# Patient Record
Sex: FEMALE | Race: WHITE
[De-identification: names, ages, dates, MRNs, and addresses within clinical notes are randomized per-mention and may not be internally consistent; named-entity substitution may affect disease eponyms.]

---

## 2017-11-06 ENCOUNTER — HOSPITAL ENCOUNTER (EMERGENCY)
Dept: HOSPITAL 17 - NEPD | Age: 22
Discharge: HOME | End: 2017-11-06
Payer: COMMERCIAL

## 2017-11-06 VITALS — BODY MASS INDEX: 21.48 KG/M2 | HEIGHT: 63 IN | WEIGHT: 121.25 LBS

## 2017-11-06 VITALS
SYSTOLIC BLOOD PRESSURE: 136 MMHG | HEART RATE: 86 BPM | OXYGEN SATURATION: 100 % | TEMPERATURE: 98.2 F | DIASTOLIC BLOOD PRESSURE: 86 MMHG | RESPIRATION RATE: 12 BRPM

## 2017-11-06 DIAGNOSIS — R10.31: Primary | ICD-10-CM

## 2017-11-06 LAB
ANION GAP SERPL CALC-SCNC: 9 MEQ/L (ref 5–15)
BACTERIA #/AREA URNS HPF: (no result) /HPF
BASOPHILS # BLD AUTO: 0.1 TH/MM3 (ref 0–0.2)
BASOPHILS NFR BLD: 0.7 % (ref 0–2)
BUN SERPL-MCNC: 10 MG/DL (ref 7–18)
CHLORIDE SERPL-SCNC: 104 MEQ/L (ref 98–107)
COLOR UR: (no result)
COMMENT (UR): (no result)
CULTURE IF INDICATED: (no result)
EOSINOPHIL # BLD: 0 TH/MM3 (ref 0–0.4)
EOSINOPHIL NFR BLD: 0.1 % (ref 0–4)
ERYTHROCYTE [DISTWIDTH] IN BLOOD BY AUTOMATED COUNT: 11.7 % (ref 11.6–17.2)
GFR SERPLBLD BASED ON 1.73 SQ M-ARVRAT: 84 ML/MIN (ref 89–?)
GLUCOSE UR STRIP-MCNC: (no result) MG/DL
HCO3 BLD-SCNC: 24.3 MEQ/L (ref 21–32)
HCT VFR BLD CALC: 44.5 % (ref 35–46)
HEMO FLAGS: (no result)
HGB UR QL STRIP: (no result)
KETONES UR STRIP-MCNC: (no result) MG/DL
LYMPHOCYTES # BLD AUTO: 1.5 TH/MM3 (ref 1–4.8)
LYMPHOCYTES NFR BLD AUTO: 11.8 % (ref 9–44)
MCH RBC QN AUTO: 30.1 PG (ref 27–34)
MCHC RBC AUTO-ENTMCNC: 34.9 % (ref 32–36)
MCV RBC AUTO: 86.5 FL (ref 80–100)
MONOCYTES NFR BLD: 3.5 % (ref 0–8)
NEUTROPHILS # BLD AUTO: 10.8 TH/MM3 (ref 1.8–7.7)
NEUTROPHILS NFR BLD AUTO: 83.9 % (ref 16–70)
NITRITE UR QL STRIP: (no result)
PLATELET # BLD: 186 TH/MM3 (ref 150–450)
POTASSIUM SERPL-SCNC: 3.9 MEQ/L (ref 3.5–5.1)
RBC # BLD AUTO: 5.14 MIL/MM3 (ref 4–5.3)
SODIUM SERPL-SCNC: 137 MEQ/L (ref 136–145)
SP GR UR STRIP: 1 (ref 1–1.03)
SQUAMOUS #/AREA URNS HPF: <1 /HPF (ref 0–5)
WBC # BLD AUTO: 12.9 TH/MM3 (ref 4–11)

## 2017-11-06 PROCEDURE — 80048 BASIC METABOLIC PNL TOTAL CA: CPT

## 2017-11-06 PROCEDURE — 84703 CHORIONIC GONADOTROPIN ASSAY: CPT

## 2017-11-06 PROCEDURE — 99285 EMERGENCY DEPT VISIT HI MDM: CPT

## 2017-11-06 PROCEDURE — 81001 URINALYSIS AUTO W/SCOPE: CPT

## 2017-11-06 PROCEDURE — 74177 CT ABD & PELVIS W/CONTRAST: CPT

## 2017-11-06 PROCEDURE — 85025 COMPLETE CBC W/AUTO DIFF WBC: CPT

## 2017-11-06 NOTE — PD
HPI


.


Abdominal pain


Chief Complaint:  Abdominal Pain


Time Seen by Provider:  09:15


Travel History


International Travel<30 days:  No


Contact w/Intl Traveler<30days:  No


Traveled to known affect area:  No





History of Present Illness


HPI


This patient presents with a chief complaint of abdominal pain.  It is located 

in the right lower quadrant.  Onset was 2 AM.  She states that her pain has 

been continuous and dull with episodes of pulsating pain.  The pain has waxed 

and waned.  She rates the pain 2/10.  She states that the pain is exacerbated 

by palpating the area but is not exacerbated by walking.  She reports 

associated nausea and bloating as well as chills.  She did eat breakfast this 

morning.





[facility]


Past Medical History


Medical History:  Denies Significant Hx


Pregnant?:  Not Pregnant


LMP:  10/25/17





Past Surgical History


Surgical History:  No Previous Surgery





Social History


Alcohol Use:  No


Tobacco Use:  No


Substance Use:  No





Allergies-Medications


(Allergen,Severity, Reaction):  


Coded Allergies:  


     No Known Allergies (Unverified , 11/6/17)


Reported Meds & Prescriptions





Reported Meds & Active Scripts


Active


No Active Prescriptions or Reported Medications    








Review of Systems


Except as stated in HPI:  all other systems reviewed are Neg


General / Constitutional:  Positive: Chills, No: Fever


Gastrointestinal:  Positive: Nausea, Abdominal Pain, No: Vomiting, Diarrhea, 

Loss of Appetite


Genitourinary:  No: Urgency, Frequency, Dysuria





Physical Exam


Narrative


GENERAL: Awake and alert.  The patient is smiling.


SKIN:  warm/dry.


HEAD: Normocephalic.  Atraumatic.


EYES: Pupils equal and round. No scleral icterus. No injection or drainage. 


ENT: No nasal bleeding or discharge.  Mucous membranes pink and moist.


NECK: Trachea midline.  Full range of motion without pain.. 


CARDIOVASCULAR: Regular rate and rhythm.  


RESPIRATORY: No accessory muscle use. Clear to auscultation. Breath sounds 

equal bilaterally. 


GASTROINTESTINAL: Abdomen soft.  Minimal right lower quadrant tenderness.  

Bowel sounds present.  Nondistended.


MUSCULOSKELETAL: No obvious deformities. 


NEUROLOGICAL: Awake and alert. No obvious cranial nerve deficits.  Motor 

grossly within normal limits. Normal speech.


PSYCHIATRIC: Appropriate mood and affect; insight and judgment normal.





Data


Data


Last Documented VS





Vital Signs








  Date Time  Temp Pulse Resp B/P (MAP) Pulse Ox O2 Delivery O2 Flow Rate FiO2


 


11/6/17 08:47 98.2 86 12 136/86 (103) 100   








Orders





 Orders


Basic Metabolic Panel (Bmp) (11/6/17 09:15)


Complete Blood Count With Diff (11/6/17 09:15)


Urinalysis - C+S If Indicated (11/6/17 09:15)


Ct Abd/Pel W Iv Contrast(Rout) (11/6/17 09:15)


Iv Access Insert/Monitor (11/6/17 09:15)


Morphine Inj (Morphine Inj) (11/6/17 09:15)


Ondansetron Inj (Zofran Inj) (11/6/17 09:15)


Sodium Chloride 0.9% Flush (Ns Flush) (11/6/17 09:15)


Ed Urine Pregnancytest Poc (11/6/17 09:15)


Iohexol 350 Inj (Omnipaque 350 Inj) (11/6/17 10:08)





Labs





Laboratory Tests








Test


  11/6/17


09:30


 


White Blood Count 12.9 TH/MM3 


 


Red Blood Count 5.14 MIL/MM3 


 


Hemoglobin 15.5 GM/DL 


 


Hematocrit 44.5 % 


 


Mean Corpuscular Volume 86.5 FL 


 


Mean Corpuscular Hemoglobin 30.1 PG 


 


Mean Corpuscular Hemoglobin


Concent 34.9 % 


 


 


Red Cell Distribution Width 11.7 % 


 


Platelet Count 186 TH/MM3 


 


Mean Platelet Volume 8.1 FL 


 


Neutrophils (%) (Auto) 83.9 % 


 


Lymphocytes (%) (Auto) 11.8 % 


 


Monocytes (%) (Auto) 3.5 % 


 


Eosinophils (%) (Auto) 0.1 % 


 


Basophils (%) (Auto) 0.7 % 


 


Neutrophils # (Auto) 10.8 TH/MM3 


 


Lymphocytes # (Auto) 1.5 TH/MM3 


 


Monocytes # (Auto) 0.5 TH/MM3 


 


Eosinophils # (Auto) 0.0 TH/MM3 


 


Basophils # (Auto) 0.1 TH/MM3 


 


CBC Comment DIFF FINAL 


 


Differential Comment  


 


Urine Color LIGHT-YELLOW 


 


Urine Turbidity CLEAR 


 


Urine pH 6.5 


 


Urine Specific Gravity 1.005 


 


Urine Protein NEG mg/dL 


 


Urine Glucose (UA) NEG mg/dL 


 


Urine Ketones NEG mg/dL 


 


Urine Occult Blood NEG 


 


Urine Nitrite NEG 


 


Urine Bilirubin NEG 


 


Urine Urobilinogen


  LESS THAN 2.0


MG/DL


 


Urine Leukocyte Esterase NEG 


 


Urine Squamous Epithelial


Cells <1 /hpf 


 


 


Urine Bacteria RARE /hpf 


 


Microscopic Urinalysis Comment


  CULT NOT


INDICATED


 


Blood Urea Nitrogen 10 MG/DL 


 


Creatinine 0.84 MG/DL 


 


Random Glucose 98 MG/DL 


 


Calcium Level 8.8 MG/DL 


 


Sodium Level 137 MEQ/L 


 


Potassium Level 3.9 MEQ/L 


 


Chloride Level 104 MEQ/L 


 


Carbon Dioxide Level 24.3 MEQ/L 


 


Anion Gap 9 MEQ/L 


 


Estimat Glomerular Filtration


Rate 84 ML/MIN 


 











MDM


Medical Decision Making


Medical Screen Exam Complete:  Yes


Emergency Medical Condition:  Yes


Differential Diagnosis


Differential diagnosis of abdominal pain includes but is not limited to 

gastritis, pancreatitis, hepatitis, gastroenteritis, gallbladder disease, 

constipation, urinary retention, UTI, peptic ulcer disease, diverticulitis or 

appendicitis


Narrative Course


This patient presents with a chief complaint of right lower quadrant abdominal 

pain.  Her exam is really pretty unimpressive.  She is smiling and does not 

appear to be in any significant discomfort.





CBC & BMP Diagram


11/6/17 09:30








Calcium Level 8.8





Pregnancy test is negative.





UA is negative.





CT of her abdomen is negative.





Diagnosis





 Primary Impression:  


 Abdominal pain


 Qualified Codes:  R10.31 - Right lower quadrant pain


Patient Instructions:  Abdominal Pain (ED), General Instructions


***Med/Other Pt SpecificInfo:  Prescription(s) given


Scripts


Dicyclomine (Bentyl) 10 Mg Cap


20 MG PO QID for Bowel Management, #10 CAP 0 Refills


   Prov: Natasha Soto MD         11/6/17


Disposition:  01 DISCHARGE HOME


Condition:  Stable











Natasha Soto MD Nov 6, 2017 10:23

## 2017-11-06 NOTE — RADRPT
EXAM DATE/TIME:  11/06/2017 09:56 

 

[facility] COMPARISON:     

No previous studies available for comparison.

 

 

INDICATIONS :     

Right lower abdomen pain today.

                      

 

IV CONTRAST:     

80 cc Omnipaque 350 (iohexol) IV 

 

 

ORAL CONTRAST:      

No oral contrast ingested.

                      

 

RADIATION DOSE:     

4.85 CTDIvol (mGy) 

 

 

MEDICAL HISTORY :     

None  

 

SURGICAL HISTORY :      

None. 

 

ENCOUNTER:      

Initial

 

ACUITY:      

1 day

 

PAIN SCALE:      

2/10

 

LOCATION:       

Right lower quadrant 

 

TECHNIQUE:     

Volumetric scanning of the abdomen and pelvis was performed.  Using automated exposure control and ad
justment of the mA and/or kV according to patient size, radiation dose was kept as low as reasonably 
achievable to obtain optimal diagnostic quality images.  DICOM format image data is available electro
nically for review and comparison.  

 

FINDINGS:     

 

LOWER LUNGS:     

The visualized lower lungs are clear.

 

LIVER:     

Homogeneous density without lesion.  There is no dilation of the biliary tree.  No calcified gallston
es.

 

SPLEEN:     

Normal size without lesion.

 

PANCREAS:     

Within normal limits.

 

KIDNEYS:     

Normal in size and shape.  There is no mass, stone or hydronephrosis.

 

ADRENAL GLANDS:     

Within normal limits.

 

VASCULAR:     

There is no aortic aneurysm.

 

BOWEL/MESENTERY:     

The stomach, small bowel, and colon demonstrate no acute abnormality.  There is no free intraperitone
al air or fluid. Adnexa not well seen but no inflammatory changes right lower quadrant.

 

ABDOMINAL WALL:     

Within normal limits.

 

RETROPERITONEUM:     

There is no lymphadenopathy. 

 

BLADDER:     

No wall thickening or mass. 

 

REPRODUCTIVE:     

No pelvic free fluid. There is thickening of the endometrium likely related to secretory phase of men
strual cycle.

 

INGUINAL:     

There is no lymphadenopathy or hernia. 

 

MUSCULOSKELETAL:     

Within normal limits for patient age. 

 

CONCLUSION:     

1. No inflammatory changes right lower quadrant.

2. Prominent endometrium consistent with normal phase of menstrual cycle.

 

 

 

 Paco Faulkner MD on November 06, 2017 at 10:11           

Board Certified Radiologist.

 This report was verified electronically.

## 2019-04-26 ENCOUNTER — APPOINTMENT (RX ONLY)
Dept: URBAN - METROPOLITAN AREA CLINIC 61 | Facility: CLINIC | Age: 24
Setting detail: DERMATOLOGY
End: 2019-04-26

## 2019-04-26 DIAGNOSIS — L20.89 OTHER ATOPIC DERMATITIS: ICD-10-CM

## 2019-04-26 PROBLEM — L20.84 INTRINSIC (ALLERGIC) ECZEMA: Status: ACTIVE | Noted: 2019-04-26

## 2019-04-26 PROCEDURE — 99201: CPT

## 2019-04-26 PROCEDURE — ? PRESCRIPTION SAMPLES PROVIDED

## 2019-04-26 PROCEDURE — ? COUNSELING

## 2019-04-26 ASSESSMENT — LOCATION ZONE DERM: LOCATION ZONE: FINGER

## 2019-04-26 ASSESSMENT — LOCATION DETAILED DESCRIPTION DERM: LOCATION DETAILED: RIGHT DORSAL THUMB METACARPOPHALANGEAL JOINT

## 2019-04-26 ASSESSMENT — LOCATION SIMPLE DESCRIPTION DERM: LOCATION SIMPLE: RIGHT THUMB
